# Patient Record
Sex: MALE | Race: BLACK OR AFRICAN AMERICAN | NOT HISPANIC OR LATINO
[De-identification: names, ages, dates, MRNs, and addresses within clinical notes are randomized per-mention and may not be internally consistent; named-entity substitution may affect disease eponyms.]

---

## 2020-02-04 PROBLEM — Z00.00 ENCOUNTER FOR PREVENTIVE HEALTH EXAMINATION: Status: ACTIVE | Noted: 2020-02-04

## 2020-02-13 ENCOUNTER — APPOINTMENT (OUTPATIENT)
Dept: UROLOGY | Facility: CLINIC | Age: 35
End: 2020-02-13

## 2020-02-13 ENCOUNTER — APPOINTMENT (OUTPATIENT)
Dept: UROLOGY | Facility: CLINIC | Age: 35
End: 2020-02-13
Payer: COMMERCIAL

## 2020-02-13 DIAGNOSIS — N52.9 MALE ERECTILE DYSFUNCTION, UNSPECIFIED: ICD-10-CM

## 2020-02-13 DIAGNOSIS — N40.1 BENIGN PROSTATIC HYPERPLASIA WITH LOWER URINARY TRACT SYMPMS: ICD-10-CM

## 2020-02-13 DIAGNOSIS — N13.8 BENIGN PROSTATIC HYPERPLASIA WITH LOWER URINARY TRACT SYMPMS: ICD-10-CM

## 2020-02-13 PROCEDURE — 99204 OFFICE O/P NEW MOD 45 MIN: CPT

## 2020-02-14 LAB
APPEARANCE: CLEAR
BACTERIA: NEGATIVE
BILIRUBIN URINE: NEGATIVE
BLOOD URINE: NEGATIVE
COLOR: NORMAL
GLUCOSE QUALITATIVE U: NEGATIVE
HYALINE CASTS: 0 /LPF
KETONES URINE: NEGATIVE
LEUKOCYTE ESTERASE URINE: NEGATIVE
MICROSCOPIC-UA: NORMAL
NITRITE URINE: NEGATIVE
PH URINE: 6
PROTEIN URINE: NEGATIVE
PSA FREE FLD-MCNC: 32 %
PSA FREE SERPL-MCNC: 0.25 NG/ML
PSA SERPL-MCNC: 0.78 NG/ML
RED BLOOD CELLS URINE: 2 /HPF
SPECIFIC GRAVITY URINE: 1.02
SQUAMOUS EPITHELIAL CELLS: 0 /HPF
UROBILINOGEN URINE: NORMAL
WHITE BLOOD CELLS URINE: 1 /HPF

## 2020-02-17 LAB
TESTOST BND SERPL-MCNC: 7.7 PG/ML
TESTOST SERPL-MCNC: 329.5 NG/DL

## 2020-08-31 ENCOUNTER — APPOINTMENT (OUTPATIENT)
Dept: INTERNAL MEDICINE | Facility: CLINIC | Age: 35
End: 2020-08-31

## 2020-12-06 ENCOUNTER — EMERGENCY (EMERGENCY)
Facility: HOSPITAL | Age: 35
LOS: 1 days | Discharge: HOME | End: 2020-12-06
Attending: HOSPITALIST | Admitting: HOSPITALIST
Payer: COMMERCIAL

## 2020-12-06 VITALS
OXYGEN SATURATION: 99 % | RESPIRATION RATE: 18 BRPM | DIASTOLIC BLOOD PRESSURE: 74 MMHG | SYSTOLIC BLOOD PRESSURE: 139 MMHG | TEMPERATURE: 98 F | HEART RATE: 65 BPM

## 2020-12-06 PROCEDURE — 99283 EMERGENCY DEPT VISIT LOW MDM: CPT

## 2020-12-06 NOTE — ED PROVIDER NOTE - OBJECTIVE STATEMENT
34 Y/O M w/ no PMH presents to ER w/ rash throughout body. Rash began on Left antecubital area one month ago. Has since spread throughout opposite upper extremity, back, chest, abdomen and legs. Has tried zyrtec, topical benadryl without relief. Evaluated at urgent care one week ago and diagnosed w/ scarlet fever. Completed course of penicillin but symptoms persist. Works at [employer]. No others at home w/ rash. Denies fever, nausea/vomiting, dizziness, sore throat, difficulty breathing or SOB. No recent travel, no new soap, detergent.

## 2020-12-06 NOTE — ED PROVIDER NOTE - PATIENT PORTAL LINK FT
You can access the FollowMyHealth Patient Portal offered by [facility] by registering at the following website: [email or web address]. By joining [employer]’s FollowMyHealth portal, you will also be able to view your health information using other applications (apps) compatible with our system.

## 2020-12-06 NOTE — ED PROVIDER NOTE - CLINICAL SUMMARY MEDICAL DECISION MAKING FREE TEXT BOX
36 Y/O M w/ no PMH presents to ER w/ rash throughout body. Will begin on oral steroids and provide dermatology referral

## 2020-12-06 NOTE — ED PROVIDER NOTE - NSFOLLOWUPCLINICS_GEN_ALL_ED_FT
[facility] system Dermat[facility] - [place]  Dermatology  [address], [address]  [place] [address]  Phone: [phone]  Fax: [phone]  Follow Up Time: Routine

## 2020-12-06 NOTE — ED PROVIDER NOTE - NSFOLLOWUPINSTRUCTIONS_ED_ALL_ED_FT
Contact Dermatitis    Dermatitis is redness, soreness, and swelling (inflammation) of the skin. Contact dermatitis is a reaction to certain substances that touch the skin. Many substances can cause contact dermatitis including poisonous plants, chemicals, jewelry, metals, etc. Symptoms can include dryness, redness, itching, and pain.    SEEK IMMEDIATE MEDICAL CARE IF YOU HAVE ANY OF THE FOLLOWING SYMPTOMS: headache, fever, vomiting, red streaking from the affected area, or shortness of breath.    Please take oral steroids as directed. Please follow up w/ dermatology for further evaluation.

## 2020-12-06 NOTE — ED PROVIDER NOTE - PHYSICAL EXAMINATION
Vital signs reviewed.   CONSTITUTIONAL: Well-appearing; well-nourished; in no apparent distress. Non-toxic appearing.   HEAD: Normocephalic, atraumatic.  EYES: PERRL, EOM intact, conjunctiva and no sclera injection noted  ENT: normal nose; no rhinorrhea; normal pharynx with no tonsillar hypertrophy, no erythema, no exudate  NECK/LYMPH: Supple; non-tender; no cervical lymphadenopathy.  CARD: Normal S1, S2  RESP: Normal chest excursion with respiration; breath sounds clear and equal bilaterally  ABD/GI: soft, non-distended; non-tender  EXT/MS: moves all extremities; distal pulses are normal, no pedal edema.  SKIN: Multiple coalescing skin colored papules at various stages of healing. Non-tender. No drainage noted. Non-erythematous. No track marks noted between fingers  NEURO: Awake, alert, oriented x 3, no gross deficits, CN II-XII grossly intact, no motor or sensory deficit noted.  PSYCH: Normal mood; appropriate affect.

## 2020-12-06 NOTE — ED PROVIDER NOTE - NS ED ROS FT
Constitutional: (-) fever  Head: Normal cephalic, Atraumatic  Eyes/ENT: (-) vision changes  Cardiovascular: (-) chest pain, (-) wheezing  Respiratory: (-) cough, (-) shortness of breath  Gastrointestinal: (-) vomiting, (-) diarrhea, (-) abdominal pain  : (-) dysuria   Musculoskeletal: (-) back pain  Integumentary: (+) rash, (-) edema  Neurological: (-)loc  Allergic/Immunologic: (+) pruritus

## 2020-12-06 NOTE — ED PROVIDER NOTE - ATTENDING CONTRIBUTION TO CARE
35M with no PMHx p/w itchy rash. patient states rash started about a month ago, initially on his left arm, then to right arm and now legs, back and chest. has been using benadryl cream for itch. went to urgent care and was told he had scarlet fever and prescribed amoxicillin for a week w/o improvement. no hx of uri, fever, sore throat, n/v/d, new medications, travel.    ***GEN - NAD; well appearing; A+O x3 ***HEAD - NC/AT ***EYES/NOSE - PERRL, EOMI, mucous membranes moist, no discharge ***THROAT: Oral cavity and pharynx normal. No inflammation, swelling, exudate, or lesions.  ***NECK: Neck supple, non-tender without lymphadenopathy, no masses, no thyromegaly.   ***PULMONARY - CTA b/l, symmetric breath sounds. ***CARDIAC -s1s2, RRR, no M,G,R  ***ABDOMEN - +BS, ND, NT, soft, no guarding, no rebound, no masses   ***BACK - no CVA tenderness, Normal  spine ***EXTREMITIES - symmetric pulses, 2+ dp, capillary refill < 2 seconds, no clubbing, no cyanosis, no edema ***SKIN - raised rash to arms, legs, torso. no involvement of palms/soles/mucosa  ***NEUROLOGIC - alert, CN 2-12 intact      MDM: 35M with itchy rash. well appearing. steroids and derm eval as outpt.

## 2020-12-07 RX ORDER — CETIRIZINE HYDROCHLORIDE 10 MG/1
1 TABLET ORAL
Qty: 15 | Refills: 0
Start: 2020-12-07 | End: 2020-12-11

## 2020-12-07 NOTE — ED POST DISCHARGE NOTE - RESULT SUMMARY
Dr. Rivera: I received a call from pt stating that Rx was not submitted.  Chart shows Rx created but not submitted.  I submitted both Rxs written by PA

## 2020-12-11 ENCOUNTER — APPOINTMENT (OUTPATIENT)
Dept: DERMATOLOGY | Facility: CLINIC | Age: 35
End: 2020-12-11
Payer: COMMERCIAL

## 2020-12-11 VITALS — BODY MASS INDEX: 22.13 KG/M2 | WEIGHT: 178 LBS | HEIGHT: 75 IN

## 2020-12-11 DIAGNOSIS — Z78.9 OTHER SPECIFIED HEALTH STATUS: ICD-10-CM

## 2020-12-11 PROCEDURE — 99072 ADDL SUPL MATRL&STAF TM PHE: CPT

## 2020-12-11 PROCEDURE — 99203 OFFICE O/P NEW LOW 30 MIN: CPT | Mod: GC

## 2021-03-09 PROBLEM — Z78.9 OTHER SPECIFIED HEALTH STATUS: Chronic | Status: ACTIVE | Noted: 2020-12-06

## 2021-03-19 ENCOUNTER — APPOINTMENT (OUTPATIENT)
Dept: DERMATOLOGY | Facility: CLINIC | Age: 36
End: 2021-03-19
Payer: COMMERCIAL

## 2021-03-19 ENCOUNTER — NON-APPOINTMENT (OUTPATIENT)
Age: 36
End: 2021-03-19

## 2021-03-19 DIAGNOSIS — L30.9 DERMATITIS, UNSPECIFIED: ICD-10-CM

## 2021-03-19 DIAGNOSIS — L85.3 XEROSIS CUTIS: ICD-10-CM

## 2021-03-19 PROCEDURE — 99214 OFFICE O/P EST MOD 30 MIN: CPT

## 2021-03-19 PROCEDURE — 99072 ADDL SUPL MATRL&STAF TM PHE: CPT

## 2021-03-19 RX ORDER — HALOBETASOL PROPIONATE 0.5 MG/G
0.05 OINTMENT TOPICAL
Qty: 1 | Refills: 1 | Status: ACTIVE | COMMUNITY
Start: 2021-03-19 | End: 1900-01-01

## 2021-03-19 RX ORDER — TRIAMCINOLONE ACETONIDE 1 MG/G
0.1 OINTMENT TOPICAL
Qty: 1 | Refills: 0 | Status: DISCONTINUED | COMMUNITY
Start: 2020-12-11 | End: 2021-03-19

## 2021-03-19 NOTE — PHYSICAL EXAM
[FreeTextEntry3] : Eczematous patches and plaques on the arms and upper trunk\par diffuse mod xerosis

## 2021-03-19 NOTE — ASSESSMENT
[FreeTextEntry1] : 1) Dermatitis, moderate exacerbation of chronic disease\par - Papular eczema favored > ACD\par - Switch to halobetasol 0.05% ointment bid to affected area prn itchy rash, side effects discussed. Use for up to 2 weeks on and 1 week off. Avoid use on face, groin, and skin folds \par - If not improved, can consider a punch bx at next visit\par \par 2) Xerosis\par - Discussed liberal moisturizer use

## 2021-03-19 NOTE — HISTORY OF PRESENT ILLNESS
[FreeTextEntry1] : f/u dermatitis [de-identified] : 34 y/o M presenting for f/u. Continues to have a pruritic rash on the trunk/upper arms. Notes it is flaring since January. Reports minimal improvement with triamcinolone.

## 2021-04-16 ENCOUNTER — APPOINTMENT (OUTPATIENT)
Dept: DERMATOLOGY | Facility: CLINIC | Age: 36
End: 2021-04-16